# Patient Record
Sex: FEMALE | Race: WHITE
[De-identification: names, ages, dates, MRNs, and addresses within clinical notes are randomized per-mention and may not be internally consistent; named-entity substitution may affect disease eponyms.]

---

## 2019-07-08 ENCOUNTER — HOSPITAL ENCOUNTER (EMERGENCY)
Dept: HOSPITAL 25 - ED | Age: 84
Discharge: HOME | End: 2019-07-08
Payer: MEDICARE

## 2019-07-08 VITALS — SYSTOLIC BLOOD PRESSURE: 158 MMHG | DIASTOLIC BLOOD PRESSURE: 84 MMHG

## 2019-07-08 DIAGNOSIS — Z79.82: ICD-10-CM
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
## 2019-07-08 DIAGNOSIS — S09.90XA: Primary | ICD-10-CM
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
## 2019-07-08 DIAGNOSIS — S62.102A: ICD-10-CM

## 2019-07-08 PROCEDURE — 99283 EMERGENCY DEPT VISIT LOW MDM: CPT

## 2019-07-08 PROCEDURE — 70450 CT HEAD/BRAIN W/O DYE: CPT

## 2019-07-08 PROCEDURE — 90715 TDAP VACCINE 7 YRS/> IM: CPT

## 2019-07-08 PROCEDURE — 90471 IMMUNIZATION ADMIN: CPT

## 2019-07-08 NOTE — CONS
CONSULTATION REPORT:

 

DATE OF CONSULT:  07/08/19 - EMERGENCY DEPT

 

ATTENDING ORTHOPEDIC PROVIDER:  Dr. Jarred Mcdonald.

 

CHIEF COMPLAINT:  Left wrist fracture.

 

HISTORY OF PRESENT ILLNESS:  The patient is an 87-year-old female who suffered 
a mechanical fall at home.  She was walking with a friend when she tripped on 
the sidewalk and fell onto an outstretched left hand.  She is left-hand 
dominant.  She had immediate pain in the left wrist and also hit her right knee
, though she was able to get up and walk home.  She walks without an assistive 
device.  [facility] an independent [facility].  Today, she 
complains of left wrist pain without any other complaints.  Pain is localized 
to the left wrist. It is sharp and severe in nature.  She has had Tylenol for 
pain, which is adequately controlling the pain at rest.

 

PAST MEDICAL HISTORY: Denies.

 

PAST SURGICAL HISTORY:  None.

 

ALLERGIES:  No known drug allergies.

 

REVIEW OF SYSTEMS:  General:  No fever or chills.  HEENT:  No head trauma. 
Cardiac:  No chest pain.  Respiratory:  No shortness of breath.  GI:  No 
abdominal pain.  Musculoskeletal:  Positive for left wrist pain and right knee 
pain.  Neuro: Normal sensation throughout all extremities without any numbness 
or paresthesias. Skin:  Small laceration at the base of the right pinky.

 

PHYSICAL EXAM:  Vital Signs:  Temperature 97.8, pulse rate 62, respiratory rate 
17, oxygen saturation 97% on room air, blood pressure 162/64.  General:  No 
acute distress.  HEENT:  Normocephalic, atraumatic.  Respiratory:  Normal rate 
and effort of breathing.  Cardiovascular:  Radial pulse 2+ bilaterally.  
Capillary refill less than 2 seconds distally BL UE both before and after 
reduction of wrist and bilateral upper extremities.  Abdomen is nondistended.  
Left upper extremity:  Left wrist with obvious deformity and ecchymosis.  The 
patient is able to flex and extend at MCPs, DIPs, PIPs.  Able to produce ok, 
cross-finger and thumbs-up signs.  Sensation is intact to light touch 
throughout all aspects of the hand and digits.  The forearm is compressible.

 

PROCEDURE:  Consent was signed for left wrist hematoma block and reduction of 
the left wrist.  5 cc of 1% lidocaine and 5 cc of bupivacaine were injected 
into the hematoma fracture site.  Left wrist was reduced and splint placed, 
tolerated well by the patient.  Neurovascularly intact distally after reduction.

 

ASSESSMENT:  Left distal radius fracture.

 

PLAN:  Reduction of the left wrist with splint placement.  The patient will 
follow up with Dr. Ewing or Dr. Cardenas, one of our hand surgeons, this week.  
This patient was discussed with Dr. Jarred Mcdonald who agrees with the plan and 
reviewed post reduction films. 



____________________________________ ERIC ALLRED

 

[id number]/CPS #: [id number]

MARCO

## 2019-07-09 ENCOUNTER — HOSPITAL ENCOUNTER (EMERGENCY)
Dept: HOSPITAL 25 - ED | Age: 84
Discharge: NURSING, REHAB OR LONG-TERM CARE | End: 2019-07-09
Payer: MEDICARE

## 2019-07-09 VITALS — DIASTOLIC BLOOD PRESSURE: 74 MMHG | SYSTOLIC BLOOD PRESSURE: 172 MMHG
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
## 2019-07-09 DIAGNOSIS — S52.592A: Primary | ICD-10-CM

## 2019-07-09 LAB
ALBUMIN SERPL BCG-MCNC: 3.9 G/DL (ref 3.2–5.2)
ALBUMIN/GLOB SERPL: 1.5 {RATIO} (ref 1–3)
ALP SERPL-CCNC: 70 U/L (ref 34–104)
ALT SERPL W P-5'-P-CCNC: 11 U/L (ref 7–52)
ANION GAP SERPL CALC-SCNC: 6 MMOL/L (ref 2–11)
AST SERPL-CCNC: 21 U/L (ref 13–39)
BASOPHILS # BLD AUTO: 0 10^3/UL (ref 0–0.2)
BUN SERPL-MCNC: 15 MG/DL (ref 6–24)
BUN/CREAT SERPL: 25.4 (ref 8–20)
CALCIUM SERPL-MCNC: 9 MG/DL (ref 8.6–10.3)
CHLORIDE SERPL-SCNC: 102 MMOL/L (ref 101–111)
EOSINOPHIL # BLD AUTO: 0 10^3/UL (ref 0–0.6)
GLOBULIN SER CALC-MCNC: 2.6 G/DL (ref 2–4)
GLUCOSE SERPL-MCNC: 103 MG/DL (ref 70–100)
HCO3 SERPL-SCNC: 29 MMOL/L (ref 22–32)
HCT VFR BLD AUTO: 38 % (ref 35–47)
HGB BLD-MCNC: 12.6 G/DL (ref 12–16)
LYMPHOCYTES # BLD AUTO: 0.7 10^3/UL (ref 1–4.8)
MCH RBC QN AUTO: 30 PG (ref 27–31)
MCHC RBC AUTO-ENTMCNC: 33 G/DL (ref 31–36)
MCV RBC AUTO: 90 FL (ref 80–97)
MONOCYTES # BLD AUTO: 0.6 10^3/UL (ref 0–0.8)
NEUTROPHILS # BLD AUTO: 7.4 10^3/UL (ref 1.5–7.7)
NRBC # BLD AUTO: 0 10^3/UL
NRBC BLD QL AUTO: 0.1
PLATELET # BLD AUTO: 200 10^3/UL (ref 150–450)
POTASSIUM SERPL-SCNC: 4.1 MMOL/L (ref 3.5–5)
PROT SERPL-MCNC: 6.5 G/DL (ref 6.4–8.9)
RBC # BLD AUTO: 4.19 10^6 /UL (ref 3.7–4.87)
SODIUM SERPL-SCNC: 137 MMOL/L (ref 135–145)
TROPONIN I SERPL-MCNC: 0 NG/ML (ref ?–0.04)
WBC # BLD AUTO: 8.7 10^3/UL (ref 3.5–10.8)

## 2019-07-09 PROCEDURE — 99283 EMERGENCY DEPT VISIT LOW MDM: CPT

## 2019-07-09 PROCEDURE — 36415 COLL VENOUS BLD VENIPUNCTURE: CPT

## 2019-07-09 PROCEDURE — 84484 ASSAY OF TROPONIN QUANT: CPT

## 2019-07-09 PROCEDURE — 71046 X-RAY EXAM CHEST 2 VIEWS: CPT

## 2019-07-09 PROCEDURE — 93005 ELECTROCARDIOGRAM TRACING: CPT

## 2019-07-09 PROCEDURE — 85025 COMPLETE CBC W/AUTO DIFF WBC: CPT

## 2019-07-09 PROCEDURE — 80053 COMPREHEN METABOLIC PANEL: CPT

## 2019-07-09 NOTE — ED
Upper Extremity Pain





- HPI Summary


HPI Summary: 


Patient's an 87-year-old female presenting to the ED for the second time in 2 

days.  Family is at bedside.  Family states overnight she had ripped her 

plaster splints off.  They reapplied the sling and came to the ED.  She is 

complaining of pain.  Tramadol was prescribed to her yesterday which provided 

good effect.  She arrives today with no other complaints.  Family at bedside 

states they would like her to have placement at this time as they're unable to 

care for her at home.








- History of Current Complaint


Chief Complaint: EDExtremityUpper


Stated Complaint: FALL YESTERDAY AND RIPPED CAST OFF PER PT SON


Time Seen by Provider: 07/09/19 06:31


Hx Obtained From: Patient


Onset/Duration: Started Hours Ago


Timing: Constant


Severity Initially: Moderate


Severity Currently: Moderate


Pain Location: Wrist


Character: Aching


Aggravating Factor(s): Movement, Lifting, Flexion, Extension


Alleviating Factor(s): Rest, Ice


Associated Signs & Symptoms: Negative: Swelling, Redness, Bruising





- Risk Factors


Non-Orthopedic Risk Factor: Negative


DVT Risk Factors: Negative


Septic Arthritis Risk Factor: Negative


Compartment Syndrome Risk Factors: Pain





- Allergies/Home Medications


Allergies/Adverse Reactions: 


 Allergies











Allergy/AdvReac Type Severity Reaction Status Date / Time


 


No Known Allergies Allergy   Verified 07/09/19 06:33














PMH/Surg Hx/FS Hx/Imm Hx


Previously Healthy: Yes


Endocrine/Hematology History: 


   Denies: Hx Diabetes


Cardiovascular History: 


   Denies: Hx Hypertension, Hx Pacemaker/ICD


Respiratory History: Reports: Other Respiratory Problems/Disorders


 History: 


   Denies: Hx Renal Disease


Sensory History: 


   Denies: Hx Hearing Aid


Psychiatric History: 


   Denies: Hx Panic Disorder





- Surgical History


Surgery Procedure, Year, and Place: DEVIATED SEPTUM.  TONSILS





- Immunization History


Hx Pertussis Vaccination: No


Immunizations Up to Date: Yes


Infectious Disease History: No


Infectious Disease History: 


   Denies: Traveled Outside the [place] in Last 30 Days





- Social History


Occupation: Unemployed


Lives: Alone


Alcohol Use: None


Hx Substance Use: No


Substance Use Type: Reports: None


Smoking Status (MU): Never Smoked Tobacco





Review of Systems


Constitutional: Negative


Negative: Fever, Chills, Fatigue, Skin Diaphoresis


Negative: Palpitations, Chest Pain


Negative: Abdominal Pain, Vomiting


Positive: no symptoms reported, hematuria


Positive: Arthralgia - left wrist pain/swelling/ecchymosis


Positive: Bruising


Neurological: Negative


All Other Systems Reviewed And Are Negative: Yes





Physical Exam


Triage Information Reviewed: Yes


Vital Signs On Initial Exam: 


 Initial Vitals











Temp Pulse Resp BP Pulse Ox


 


 97.7 F   83   16   183/82   95 


 


 07/09/19 06:30  07/09/19 06:30  07/09/19 06:30  07/09/19 06:30  07/09/19 06:30











Vital Signs Reviewed: Yes


Appearance: Positive: Well-Appearing, Well-Nourished


Skin: Positive: Warm, Skin Color Reflects Adequate Perfusion


Head/Face: Positive: Normal Head/Face Inspection


Eyes: Positive: EOMI, Conjunctiva Clear


Neck: Positive: Supple, No Lymphadenopathy


Respiratory/Lung Sounds: Positive: Clear to Auscultation


Cardiovascular: Positive: RRR, Pulses are Symmetrical in both Upper and Lower 

Extremities


Musculoskeletal: Positive: Pain @ - left wrist/  left wrist pain/swelling/

ecchymosis


Neurological: Positive: Speech Normal


Psychiatric: Positive: Affect/Mood Appropriate





Diagnostics





- Vital Signs


 Vital Signs











  Temp Pulse Resp BP Pulse Ox


 


 07/09/19 08:17   75  19  175/80  94


 


 07/09/19 06:30  97.7 F  83  16  183/82  95














- Laboratory


Lab Results: 


 Lab Results











  07/09/19 07/09/19 Range/Units





  08:53 08:53 


 


WBC  8.7   (3.5-10.8)  10^3/uL


 


RBC  4.19   (3.70-4.87)  10^6 /uL


 


Hgb  12.6   (12.0-16.0)  g/dL


 


Hct  38   (35-47)  %


 


MCV  90   (80-97)  fL


 


MCH  30   (27-31)  pg


 


MCHC  33   (31-36)  g/dL


 


RDW  15   (10-15)  %


 


Plt Count  200   (150-450)  10^3/uL


 


MPV  8.1   (7.4-10.4)  fL


 


Neut % (Auto)  84.3   %


 


Lymph % (Auto)  8.3   %


 


Mono % (Auto)  6.8   %


 


Eos % (Auto)  0.4   %


 


Baso % (Auto)  0.2   %


 


Absolute Neuts (auto)  7.4   (1.5-7.7)  10^3/ul


 


Absolute Lymphs (auto)  0.7 L   (1.0-4.8)  10^3/ul


 


Absolute Monos (auto)  0.6   (0-0.8)  10^3/ul


 


Absolute Eos (auto)  0.0   (0-0.6)  10^3/ul


 


Absolute Basos (auto)  0.0   (0-0.2)  10^3/ul


 


Absolute Nucleated RBC  0.0   10^3/ul


 


Nucleated RBC %  0.1   


 


Sodium   137  (135-145)  mmol/L


 


Potassium   4.1  (3.5-5.0)  mmol/L


 


Chloride   102  (101-111)  mmol/L


 


Carbon Dioxide   29  (22-32)  mmol/L


 


Anion Gap   6  (2-11)  mmol/L


 


BUN   15  (6-24)  mg/dL


 


Creatinine   0.59  (0.51-0.95)  mg/dL


 


Est GFR ( Amer)   116.7  (>60)  


 


Est GFR (Non-Af Amer)   96.4  (>60)  


 


BUN/Creatinine Ratio   25.4 H  (8-20)  


 


Glucose   103 H  ()  mg/dL


 


Calcium   9.0  (8.6-10.3)  mg/dL


 


Total Bilirubin   1.00  (0.2-1.0)  mg/dL


 


AST   21  (13-39)  U/L


 


ALT   11  (7-52)  U/L


 


Alkaline Phosphatase   70  ()  U/L


 


Troponin I   0.00  (<0.04)  ng/mL


 


Total Protein   6.5  (6.4-8.9)  g/dL


 


Albumin   3.9  (3.2-5.2)  g/dL


 


Globulin   2.6  (2-4)  g/dL


 


Albumin/Globulin Ratio   1.5  (1-3)  











Result Diagrams: 


 07/09/19 08:53





 07/09/19 08:53


Lab Statement: Any lab studies that have been ordered have been reviewed, and 

results considered in the medical decision making process.





Course/Dx





- Course


Course Of Treatment: Patient is an 87-year-old female presenting to the ED with 

left wrist injury.  She reports her last night and needs to have this recasted.

  X-ray obtained which shows displacement again of the distal radius.  

Discussed treatment options with family.  Discussed case with Leroy Casey (through Dr. Gillespie) who recommends sugar tong splint.  This was 

placed and appointment with orthopedics was secured tomorrow at 9:15 AM.  Spoke 

with [facility] social work who  was able to place patient at [facility] for 

rehabilitation while she is healing. Discussed with family who agrees with 

plan.  NV intact pre and post splint.





- Diagnoses


Differential Diagnosis/HQI/PQRI: Positive: Fracture (Open), Fracture (Closed)


Provider Diagnoses: 


 Left wrist fracture








Discharge





- Sign-Out/Discharge


Documenting (check all that apply): Patient Departure


Patient Received Moderate/Deep Sedation with Procedure: No





- Discharge Plan


Condition: Stable


Disposition: SKILLED NURSING FACILITY


Patient Education Materials:  Wrist Fracture in Adults (ED)


Referrals: 


Keisha Frost MD [Primary Care Provider] - 


Additional Instructions: 


Tramadol 50mg three times daily as needed for pain (take right before bed)


Follow up with orthopedics at 9am to fill out paperwork (appt at 9:15)


She will need help especially in the morning to help with her confusion


She takes no other medications


She remains ambulatory with some help or with a walker





- Billing Disposition and Condition


Condition: STABLE


Disposition: Skilled Nursing Facility

## 2019-07-09 NOTE — ED
Adult Trauma





- HPI Summary


HPI Summary: 


Patient is an 87-year-old female presenting to the ED with chief complaint of 

left wrist pain, right knee pain and left-sided head injury immediately 

following a fall this morning.   Patient denies any headache, however there is 

a notable ecchymosis to the left sided forehead.  Obvious deformity to the left 

wrist.  Abrasions to the right knee.  Patient states she was walking on the 

sidewalk when she tripped and fell.  She denies any dizziness prior to her fall 

and states this is a mechanical fall.  She was able to walk back to her 

apartment and called the ambulance.  She is only endorsing pain to the left 

wrist at this time.  She does take a baby aspirin daily.








- History of Current Complaint


Chief Complaint: EDFall


Stated Complaint: FALL INJURIES PER PT


Time Seen by Provider: 07/08/19 12:18


Hx Obtained From: Patient


Pregnant?: No


Mechanism of Injury: Twisted


Ambulatory at the Scene: No


Force: Medium


Onset/Duration: Started Hours Ago


Onset of Pain: Hours


Onset Severity: Moderate


Current Severity: Moderate


Pain Intensity: 4


Pain Scale Used: 0-10 Numeric


Character: Aching


Alleviating Factor(s): Nothing


Associated Signs & Symptoms: Positive: Negative





- Allergy/Home Medications


Allergies/Adverse Reactions: 


 Allergies











Allergy/AdvReac Type Severity Reaction Status Date / Time


 


No Known Allergies Allergy   Verified 07/09/19 06:33














PMH/Surg Hx/FS Hx/Imm Hx


Previously Healthy: Yes


Endocrine/Hematology History: 


   Denies: Hx Diabetes


Cardiovascular History: 


   Denies: Hx Hypertension, Hx Pacemaker/ICD


Respiratory History: Reports: Other Respiratory Problems/Disorders


 History: 


   Denies: Hx Renal Disease


Sensory History: 


   Denies: Hx Hearing Aid


Psychiatric History: 


   Denies: Hx Panic Disorder





- Surgical History


Surgery Procedure, Year, and Place: DEVIATED SEPTUM.  TONSILS





- Immunization History


Hx Pertussis Vaccination: No


Immunizations Up to Date: Yes


Infectious Disease History: No


Infectious Disease History: 


   Denies: Traveled Outside the US in Last 30 Days





- Social History


Occupation: Unemployed


Lives: Alone


Alcohol Use: None


Hx Substance Use: No


Substance Use Type: Reports: None


Hx Tobacco Use: No


Smoking Status (MU): Never Smoked Tobacco





Review of Systems


Constitutional: Negative


Negative: Fever, Chills, Fatigue, Skin Diaphoresis


Negative: Palpitations, Chest Pain


Genitourinary: Negative


Positive: no symptoms reported, see HPI


Positive: Arthralgia - left wrist.  Negative: Myalgia


Skin: Negative


Neurological: Negative


All Other Systems Reviewed And Are Negative: Yes





Physical Exam


Triage Information Reviewed: Yes


Vital Signs On Initial Exam: 


 Initial Vitals











Temp Pulse Resp BP Pulse Ox


 


 97.8 F   63   17   162/64   97 


 


 07/08/19 11:55  07/08/19 11:55  07/08/19 11:55  07/08/19 11:55  07/08/19 11:55











Vital Signs Reviewed: Yes


Appearance: Positive: Well-Appearing, Signs of Trauma - cephalohematoma


Skin: Positive: Skin Color Reflects Adequate Perfusion, Other - cephalohematoma 

to the L side of the head


Head/Face: Positive: Cephalohematoma


Eyes: Positive: EOMI, ANNI, Conjunctiva Clear


Neck: Positive: Supple, No Lymphadenopathy


Respiratory/Lung Sounds: Positive: Clear to Auscultation, Breath Sounds Present


Cardiovascular: Positive: RRR, Pulses are Symmetrical in both Upper and Lower 

Extremities


Musculoskeletal: Positive: Strength/ROM Intact


Neurological: Positive: Speech Normal


Psychiatric: Positive: Normal, Affect/Mood Appropriate


AVPU Assessment: Alert





Diagnostics





- Vital Signs


 Vital Signs











  Temp Pulse Resp BP Pulse Ox


 


 07/08/19 15:54  98 F  58  17  158/84  95


 


 07/08/19 11:55  97.8 F  63  17  162/64  97














- Laboratory


Lab Statement: Any lab studies that have been ordered have been reviewed, and 

results considered in the medical decision making process.





Adult Trauma Course/Dx





- Course


Course Of Treatment: During this course of treatment, the patient's evaluated 

for left wrist pain and deformity as well as head injury after a fall.  She 

does endorse some abrasions to the right knee, but is able to flex and extend 

at the knee.  She was able to ambulate back to her apartment to call the 

ambulance.  Limited range of motion to the left wrist.  She denies any 

headache.  Denies LOC.  Patient does take baby aspirin daily.  Brain CT 

obtained which was read as negative.  Left wrist x-ray obtained which shows a 

comminuted impacted fracture of the left distal radius. Called Dr. Rivera.  

Khushbu Archer to see the patient in the ED.  Hematoma block and reduction 

completed by Khushbu.  Repeat scans shows fx successfully reduced.  Splint 

placed using sugar tong.  NV intact pre and post reduction.  Given 5mg 

hydrocodone. Will f/u with Dr. Cardenas or Dr. Ewing this week.  Sling given.  

Patient ambulating well.





- Diagnoses


Differential Diagnosis/HQI/PQRI: Positive: Hematoma(s)


Provider Diagnoses: 


 Head injury, Wrist fracture








Discharge





- Sign-Out/Discharge


Documenting (check all that apply): Patient Departure


Patient Received Moderate/Deep Sedation with Procedure: No





- Discharge Plan


Condition: Stable


Disposition: HOME


Prescriptions: 


traMADol TAB* [Ultram*] 50 mg PO Q12H PRN #8 tab MDD 2


 PRN Reason: Pain


Patient Education Materials:  Wrist Fracture in Adults (ED)


Referrals: 


Keisha Frost MD [Primary Care Provider] - 


Eron Cardenas MD [Medical Doctor] - 


Sujata Ewing MD [Medical Doctor] - 


Additional Instructions: 


Please follow up with Dr. Cardenas or Dr. Ewing


Call tomorrow for an appt


Tramadol up to twice daily as needed for pain


Ibruprofen 600mg three times daily x 3-4 days


Keep the arm in the sling


Do not get the sling wet





- Billing Disposition and Condition


Condition: STABLE


Disposition: Home

## 2019-07-11 ENCOUNTER — HOSPITAL ENCOUNTER (INPATIENT)
Dept: HOSPITAL 25 - MED | Age: 84
LOS: 4 days | Discharge: HOME HEALTH | DRG: 690 | End: 2019-07-15
Attending: INTERNAL MEDICINE | Admitting: INTERNAL MEDICINE
Payer: MEDICARE

## 2019-07-11 DIAGNOSIS — Z91.040: ICD-10-CM

## 2019-07-11 DIAGNOSIS — Z91.81: ICD-10-CM

## 2019-07-11 DIAGNOSIS — Z79.1: ICD-10-CM

## 2019-07-11 DIAGNOSIS — Z79.891: ICD-10-CM

## 2019-07-11 DIAGNOSIS — Z79.82: ICD-10-CM

## 2019-07-11 DIAGNOSIS — E53.8: ICD-10-CM

## 2019-07-11 DIAGNOSIS — Z79.899: ICD-10-CM

## 2019-07-11 DIAGNOSIS — M47.816: ICD-10-CM

## 2019-07-11 DIAGNOSIS — N39.0: Primary | ICD-10-CM

## 2019-07-11 DIAGNOSIS — B96.20: ICD-10-CM

## 2019-07-11 DIAGNOSIS — J30.9: ICD-10-CM

## 2019-07-11 DIAGNOSIS — R06.00: ICD-10-CM

## 2019-07-11 DIAGNOSIS — M54.2: ICD-10-CM

## 2019-07-11 DIAGNOSIS — S52.92XD: ICD-10-CM

## 2019-07-11 DIAGNOSIS — S09.90XA: ICD-10-CM

## 2019-07-11 DIAGNOSIS — M79.89: ICD-10-CM

## 2019-07-11 DIAGNOSIS — K57.90: ICD-10-CM

## 2019-07-11 DIAGNOSIS — M54.5: ICD-10-CM

## 2019-07-11 DIAGNOSIS — K59.00: ICD-10-CM

## 2019-07-11 DIAGNOSIS — Z88.0: ICD-10-CM

## 2019-07-11 DIAGNOSIS — R41.0: ICD-10-CM
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
## 2019-07-11 DIAGNOSIS — R05: ICD-10-CM

## 2019-07-11 DIAGNOSIS — Z88.8: ICD-10-CM
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
## 2019-07-11 DIAGNOSIS — E78.5: ICD-10-CM
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
## 2019-07-11 DIAGNOSIS — F03.90: ICD-10-CM

## 2019-07-11 LAB
ALBUMIN SERPL BCG-MCNC: 4.1 G/DL (ref 3.2–5.2)
ALBUMIN/GLOB SERPL: 1.4 {RATIO} (ref 1–3)
ALP SERPL-CCNC: 69 U/L (ref 34–104)
ALT SERPL W P-5'-P-CCNC: 17 U/L (ref 7–52)
ANION GAP SERPL CALC-SCNC: 7 MMOL/L (ref 2–11)
AST SERPL-CCNC: 28 U/L (ref 13–39)
BASOPHILS # BLD AUTO: 0 10^3/UL (ref 0–0.2)
BUN SERPL-MCNC: 14 MG/DL (ref 6–24)
BUN/CREAT SERPL: 22.6 (ref 8–20)
CALCIUM SERPL-MCNC: 9.8 MG/DL (ref 8.6–10.3)
CHLORIDE SERPL-SCNC: 103 MMOL/L (ref 101–111)
EOSINOPHIL # BLD AUTO: 0.2 10^3/UL (ref 0–0.6)
GLOBULIN SER CALC-MCNC: 2.9 G/DL (ref 2–4)
GLUCOSE SERPL-MCNC: 126 MG/DL (ref 70–100)
HCO3 SERPL-SCNC: 29 MMOL/L (ref 22–32)
HCT VFR BLD AUTO: 36 % (ref 35–47)
HGB BLD-MCNC: 12.3 G/DL (ref 12–16)
LYMPHOCYTES # BLD AUTO: 1.6 10^3/UL (ref 1–4.8)
MCH RBC QN AUTO: 30 PG (ref 27–31)
MCHC RBC AUTO-ENTMCNC: 34 G/DL (ref 31–36)
MCV RBC AUTO: 89 FL (ref 80–97)
MONOCYTES # BLD AUTO: 0.7 10^3/UL (ref 0–0.8)
NEUTROPHILS # BLD AUTO: 4.7 10^3/UL (ref 1.5–7.7)
NRBC # BLD AUTO: 0 10^3/UL
NRBC BLD QL AUTO: 0.1
PLATELET # BLD AUTO: 228 10^3/UL (ref 150–450)
POTASSIUM SERPL-SCNC: 4.3 MMOL/L (ref 3.5–5)
PROT SERPL-MCNC: 7 G/DL (ref 6.4–8.9)
RBC # BLD AUTO: 4.07 10^6 /UL (ref 3.7–4.87)
RBC UR QL AUTO: (no result)
SODIUM SERPL-SCNC: 139 MMOL/L (ref 135–145)
WBC # BLD AUTO: 7.2 10^3/UL (ref 3.5–10.8)
WBC UR QL AUTO: (no result)

## 2019-07-11 PROCEDURE — 83605 ASSAY OF LACTIC ACID: CPT

## 2019-07-11 PROCEDURE — 70450 CT HEAD/BRAIN W/O DYE: CPT

## 2019-07-11 PROCEDURE — 87086 URINE CULTURE/COLONY COUNT: CPT

## 2019-07-11 PROCEDURE — 86140 C-REACTIVE PROTEIN: CPT

## 2019-07-11 PROCEDURE — 80053 COMPREHEN METABOLIC PANEL: CPT

## 2019-07-11 PROCEDURE — 81003 URINALYSIS AUTO W/O SCOPE: CPT

## 2019-07-11 PROCEDURE — 36415 COLL VENOUS BLD VENIPUNCTURE: CPT

## 2019-07-11 PROCEDURE — 81015 MICROSCOPIC EXAM OF URINE: CPT

## 2019-07-11 PROCEDURE — G0378 HOSPITAL OBSERVATION PER HR: HCPCS

## 2019-07-11 PROCEDURE — 87186 SC STD MICRODIL/AGAR DIL: CPT

## 2019-07-11 PROCEDURE — 87077 CULTURE AEROBIC IDENTIFY: CPT

## 2019-07-11 PROCEDURE — 85025 COMPLETE CBC W/AUTO DIFF WBC: CPT

## 2019-07-11 PROCEDURE — 72125 CT NECK SPINE W/O DYE: CPT

## 2019-07-11 PROCEDURE — 97530 THERAPEUTIC ACTIVITIES: CPT

## 2019-07-11 RX ADMIN — TRAMADOL HYDROCHLORIDE PRN MG: 50 TABLET, FILM COATED ORAL at 22:05

## 2019-07-11 RX ADMIN — CEFTRIAXONE SODIUM SCH MLS/HR: 1 INJECTION, POWDER, FOR SOLUTION INTRAVENOUS at 17:38

## 2019-07-11 NOTE — XMS REPORT
Continuity of Care Document (CCD)

 Created on:July 10, 2019



Patient:Kena Roth

Sex:Female

:1931

External Reference #:[id number]





Demographics







 Address  [address] [place] [address]

 

 Home Phone  [phone]

 

 Email Address  [email or web address]

 

 Preferred Language  en

 

 Marital Status  Not  or 

 

 Qu[employer] Affiliation  Unknown

 

 Race  White

 

 Ethnic Group  Not  or 









Author







 Name  Mirian Ordoñez









Care Team Providers







 Name  Role  Phone

 

 Keisha Frost MD  Primary Care Physician  Unavailable









Payers







 Date  Identification Numbers  Payment Provider  Subscriber

 

   Policy Number: [id number]  Medicare  Kena Roth









 PayID: [id number]  [address]









 [place] [address]









   Policy Number: [id number]  BS [facility]  Kena Roth









 Group Name: [id number]  [address] [address]

 

 PayID: [id number]  [place]rigal [id number]









 Expires: 2019  Policy Number: [id number]  Medicare  Kena Roth









 PayID: [id number]  [address]









 [place] [address]







Problems







 Active Problems  Provider  Date

 

 Closed fracture of distal end of radius  Eron Cardenas MD  Onset: 07/10/2019







Family History







 Date  Family Member(s)  Observation  Comments

 

   General  Cancer  

 

   General  Alzheimer's Disease  







Social History







 Type  Date  Description  Comments

 

 Birth Sex    Unknown  

 

 Lives With    Assisted living  

 

 ETOH Use    Never used alcohol  

 

 Tobacco Use  Start: Unknown  Patient has never smoked  

 

 Smoking Status  Reviewed: 07/10/19  Patient has never smoked  

 

 Exercise Type/Frequency    Exercises regularly  







Allergies, Adverse Reactions, Alerts







 Active Allergies  Reaction  Severity  Comments  Date

 

 Latex        07/10/2019

 

 Adhesive        07/10/2019







Medications







 Active Medications  SIG  Qnty  Indications  Ordering Provider  Date

 

 Oxycodone-Acetaminophe  1 tabs by mouth      Unknown  



 n  every 4-6 hours        



 5-325mg Tablets  as needed for        



   pain        







Vital Signs







 Date  Vital  Result  Comment

 

 07/10/2019 10:03am  Height  62 inches  5'2"









 Weight  119.00 lb  

 

 Heart Rate  70 /min  

 

 BP Systolic  138 mmHg  

 

 BP Diastolic  70 mmHg  

 

 Respiratory Rate  16 /min  

 

 Pain Level  3  

 

 BMI (Body Mass Index)  21.8 kg/m2  







Procedures







 Date  Code  Description  Status

 

 07/10/2019  49204  Short Arm Cast Application  Completed

 

 03/15/2018  92695  ECHO Transthorasic Realtime 2D W Doppler & Color Flow Hosp  
Completed







Plan of Treatment

Future Appointment(s):2019  9:15 am - Eron Cardenas MD at [facility]07/10/2019 - Eron Cardenas, [id number] Other intraarticular 
fracture of lower end of left radius, iFollow up:Follow up:   3 weeks

## 2019-07-12 RX ADMIN — TRAMADOL HYDROCHLORIDE PRN MG: 50 TABLET, FILM COATED ORAL at 16:05

## 2019-07-12 RX ADMIN — TRAMADOL HYDROCHLORIDE PRN MG: 50 TABLET, FILM COATED ORAL at 21:35

## 2019-07-12 RX ADMIN — ACETAMINOPHEN PRN MG: 325 TABLET ORAL at 09:12

## 2019-07-12 RX ADMIN — CEFTRIAXONE SODIUM SCH MLS/HR: 1 INJECTION, POWDER, FOR SOLUTION INTRAVENOUS at 18:15

## 2019-07-12 RX ADMIN — ACETAMINOPHEN PRN MG: 325 TABLET ORAL at 16:04

## 2019-07-12 NOTE — PN
Progress Note





- Progress Note


Date of Service: 07/12/19


Note: 


Pt sustained L DRF Monday, being treated non-op by Dr Cardenas. Placed in [facility] 

earlier this week. Asked by medical team to assess cast.





Pt with swelling and ecchymoses in hand and at elbow. Able to slide my finger 

into both ends of cast and not too tight. Fingers wwp with good cap refill. Has 

been walking halls with wrist hanging down prior to my eval.





Cast seems to be fitting well. Recommend more strict elevation when at rest, d/

w pt and family. Can f/u as scheduled with Dr Cardenas.





Jarred Mcdonald MD

## 2019-07-12 NOTE — HP
HISTORY AND PHYSICAL:

 

DATE OF ADMISSION:  07/11/19

 

HISTORY:  Ms. Roth is an 87-year-old woman being admitted for possible urinary 
tract infection.  The patient has had recent weakness.  She had a fall that 
resulted in a fracture of her left wrist.  Her family believes that she fell 
because of weakness, that she is usually quite strong and not prone to falling.
  She presented with this injury on 07/08/19.  She had dislocation.  Fracture 
was set and she was placed in a clamshell-type cast.  Her son stayed with her 
that night.  She removed the cast herself and the next day presented back to 
the emergency room after having fallen and another cast was put on.  The family 
felt that they could not care for her, so went into [facility].  
She saw Dr. Cardenas the following day.  He said that he did not feel she was a 
surgical candidate and that her fracture would heal without resetting it.  He 
felt that her arm would be functional, although there would be a deformity.  
The family asked [facility] to do the urinalysis as they felt that she was 
having urinary tract infection. He son helped to collect the urine which he 
said looked very cloudy. She did not get an antibiotic. They have been very 
frustrated with the care that she has received there and so they brought her to 
see me today, concerned that she was going to fall again unless her urinary 
tract infection was treated.  She was exhibiting more confusion. She does have 
underlying dementia.  The family is in the process of trying to get her into 
[place].  She is being admitted at this time.  Son also reports that she fell 
at [facility] last night and believed that she hit her head again.  She had an 
initial head injury with the fall on 07/08/19.

 

PREVIOUS MEDICAL PROBLEMS:  Include:

1.  History of possible TIA.

2.  B12 deficiency.

3.  Allergic rhinitis.

4.  Chronic cough.

5.  Hyperlipidemia.

6.  Constipation.

7.  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

8.  Remote history of BPPV.

9.  Diverticulosis.

 

CURRENT MEDICATIONS:

1.  Aspirin 81 mg daily.

2.  B12 1000 mcg daily.

3.  Flaxseed.

4.  Acetaminophen p.r.n.

5.  Ibuprofen p.r.n.

6.  Oxycodone 5 mg q.4 h p.r.n. pain.

7.  She also had taken tramadol that was apparently ineffective.

 

ALLERGIES:  To CYCLOBENZAPRINE, FLOVENT DISKUS, LATEX, MELATONIN, PENICILLINS, 
TRAZODONE.

 

HABITS:  Tobacco:  None.  Alcohol:  None.

 

FAMILY HISTORY:  Noncontributory.

 

SOCIAL AND PERSONAL HISTORY:  The patient had been living in [facility].  She 
is .  She is retired.  Her son and granddaughter are with her today.  
They live locally.  The patient has been concerned about living [facility]
, said she has always preferred to live with family.

 

REVIEW OF SYSTEMS:  Generally, she has been weaker, more confused.  She denied 
fever, chills, or sweats.  She has had a history of insomnia.  Skin:  She has a 
skin tear underneath the ulnar aspect of her cast.  She has ecchymosis around 
her left eye.  HEENT:  See above.  Nodes:  Negative.  Heme:  Negative.  Breasts
: Negative.  Endocrine:  Negative.  Respiratory:  Recent pneumonia few months 
ago. Cardiovascular:  History of ectopic beats.  GI:  Negative.  :  See 
above.  She has had frequency of urination.  GYN:  Negative:  Musculoskeletal:  
See above. Neuro:  See above.  Psychiatric:  She has been depressed about her 
living situation.

 

                               PHYSICAL EXAMINATION

 

GENERAL:  She is an elderly white female with cast on her left arm, in no acute 
distress.

 

VITAL SIGNS:  Blood pressure 136/64, pulse 100 and irregular, respirations 16, 
temperature 98.5.

 

SKIN:  There are ecchymosis, abrasions around her left eye.  There is a skin 
tear underneath the ulnar aspect of her cast.  Her left hand is quite 
ecchymotic.

 

HEENT:  See above.  There is swelling about the left eye and forehead.  Full 
EOMs. Mouth:  Pharynx unremarkable.

 

NECK:  Supple.

 

CHEST:  Clear. Left anterior chest wall tenderness and mild swelling. 

 

HEART:  Shows regular rhythm with frequent ectopics.  No murmurs or gallops.

 

ABDOMEN:  Soft and nontender.  No masses or organomegaly.  Bowel sounds are 
active.

 

EXTREMITIES:  See above.  Left hand is quite swollen.  Lower extremities are 
without edema.

 

NEUROLOGIC:  Without gross focal or lateralizing signs.

 

 IMPRESSION AND PLAN:  The patient with probable urinary tract infection, being 
admitted for a 23-hour OBV.  She is being started on ceftriaxone.  Labs 
including urinalysis has been ordered.  Urine culture has been ordered.  With 
her recent head injury, I want to re-image her brain with a CT scan.  If she 
does not have any intracranial bleeding, we will consider DVT prophylaxis 
tomorrow.  I will discuss code status with the family.  Her pain will be 
controlled with Tylenol for now.  If her hand continues to swell, we will ask 
for orthopedist to look at her cast.

 

 

 

[id number]/[facility] #: [id number]

MARCO

## 2019-07-13 RX ADMIN — TRAMADOL HYDROCHLORIDE PRN MG: 50 TABLET, FILM COATED ORAL at 18:31

## 2019-07-13 RX ADMIN — CEFTRIAXONE SODIUM SCH MLS/HR: 1 INJECTION, POWDER, FOR SOLUTION INTRAVENOUS at 18:32

## 2019-07-13 RX ADMIN — TRAMADOL HYDROCHLORIDE PRN MG: 50 TABLET, FILM COATED ORAL at 11:53

## 2019-07-13 RX ADMIN — ACETAMINOPHEN PRN MG: 325 TABLET ORAL at 20:15

## 2019-07-14 RX ADMIN — ACETAMINOPHEN PRN MG: 325 TABLET ORAL at 19:44

## 2019-07-14 RX ADMIN — CEFTRIAXONE SODIUM SCH MLS/HR: 1 INJECTION, POWDER, FOR SOLUTION INTRAVENOUS at 17:58

## 2019-07-14 RX ADMIN — TRAMADOL HYDROCHLORIDE PRN MG: 50 TABLET, FILM COATED ORAL at 03:26

## 2019-07-14 RX ADMIN — TRAMADOL HYDROCHLORIDE PRN MG: 50 TABLET, FILM COATED ORAL at 17:58

## 2019-07-14 RX ADMIN — ACETAMINOPHEN PRN MG: 325 TABLET ORAL at 09:50

## 2019-07-15 VITALS — DIASTOLIC BLOOD PRESSURE: 70 MMHG | SYSTOLIC BLOOD PRESSURE: 169 MMHG

## 2019-07-15 RX ADMIN — TRAMADOL HYDROCHLORIDE PRN MG: 50 TABLET, FILM COATED ORAL at 01:14

## 2019-07-15 RX ADMIN — TRAMADOL HYDROCHLORIDE PRN MG: 50 TABLET, FILM COATED ORAL at 17:29

## 2019-07-15 RX ADMIN — ACETAMINOPHEN PRN MG: 325 TABLET ORAL at 15:38

## 2019-09-06 ENCOUNTER — HOSPITAL ENCOUNTER (EMERGENCY)
Dept: HOSPITAL 25 - ED | Age: 84
Discharge: NURSING, REHAB OR LONG-TERM CARE | End: 2019-09-06
Payer: MEDICARE

## 2019-09-06 VITALS — DIASTOLIC BLOOD PRESSURE: 95 MMHG | SYSTOLIC BLOOD PRESSURE: 171 MMHG

## 2019-09-06 DIAGNOSIS — Z79.899: ICD-10-CM

## 2019-09-06 DIAGNOSIS — Z88.8: ICD-10-CM

## 2019-09-06 DIAGNOSIS — M47.896: ICD-10-CM

## 2019-09-06 DIAGNOSIS — Z91.040: ICD-10-CM

## 2019-09-06 DIAGNOSIS — M54.5: Primary | ICD-10-CM
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
## 2019-09-06 DIAGNOSIS — F03.90: ICD-10-CM

## 2019-09-06 DIAGNOSIS — Z88.0: ICD-10-CM
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
## 2019-09-06 DIAGNOSIS — Z79.82: ICD-10-CM

## 2019-09-06 LAB
ALBUMIN SERPL BCG-MCNC: 4.1 G/DL (ref 3.2–5.2)
ALBUMIN/GLOB SERPL: 1.5 {RATIO} (ref 1–3)
ALP SERPL-CCNC: 89 U/L (ref 34–104)
ALT SERPL W P-5'-P-CCNC: 12 U/L (ref 7–52)
ANION GAP SERPL CALC-SCNC: 8 MMOL/L (ref 2–11)
AST SERPL-CCNC: 21 U/L (ref 13–39)
BASOPHILS # BLD AUTO: 0 10^3/UL (ref 0–0.2)
BUN SERPL-MCNC: 14 MG/DL (ref 6–24)
BUN/CREAT SERPL: 25 (ref 8–20)
CALCIUM SERPL-MCNC: 9.4 MG/DL (ref 8.6–10.3)
CHLORIDE SERPL-SCNC: 103 MMOL/L (ref 101–111)
EOSINOPHIL # BLD AUTO: 0 10^3/UL (ref 0–0.6)
GLOBULIN SER CALC-MCNC: 2.7 G/DL (ref 2–4)
GLUCOSE SERPL-MCNC: 122 MG/DL (ref 70–100)
HCO3 SERPL-SCNC: 26 MMOL/L (ref 22–32)
HCT VFR BLD AUTO: 39 % (ref 35–47)
HGB BLD-MCNC: 13.2 G/DL (ref 12–16)
LYMPHOCYTES # BLD AUTO: 0.9 10^3/UL (ref 1–4.8)
MCH RBC QN AUTO: 30 PG (ref 27–31)
MCHC RBC AUTO-ENTMCNC: 34 G/DL (ref 31–36)
MCV RBC AUTO: 89 FL (ref 80–97)
MONOCYTES # BLD AUTO: 0.8 10^3/UL (ref 0–0.8)
NEUTROPHILS # BLD AUTO: 6.7 10^3/UL (ref 1.5–7.7)
NRBC # BLD AUTO: 0 10^3/UL
NRBC BLD QL AUTO: 0.1
PLATELET # BLD AUTO: 236 10^3/UL (ref 150–450)
POTASSIUM SERPL-SCNC: 3.9 MMOL/L (ref 3.5–5)
PROT SERPL-MCNC: 6.8 G/DL (ref 6.4–8.9)
RBC # BLD AUTO: 4.38 10^6 /UL (ref 3.7–4.87)
RBC UR QL AUTO: (no result)
SODIUM SERPL-SCNC: 137 MMOL/L (ref 135–145)
WBC # BLD AUTO: 8.4 10^3/UL (ref 3.5–10.8)

## 2019-09-06 PROCEDURE — 99284 EMERGENCY DEPT VISIT MOD MDM: CPT

## 2019-09-06 PROCEDURE — 72100 X-RAY EXAM L-S SPINE 2/3 VWS: CPT

## 2019-09-06 PROCEDURE — 36415 COLL VENOUS BLD VENIPUNCTURE: CPT

## 2019-09-06 PROCEDURE — 83605 ASSAY OF LACTIC ACID: CPT

## 2019-09-06 PROCEDURE — 80053 COMPREHEN METABOLIC PANEL: CPT

## 2019-09-06 PROCEDURE — 81015 MICROSCOPIC EXAM OF URINE: CPT

## 2019-09-06 PROCEDURE — 86140 C-REACTIVE PROTEIN: CPT

## 2019-09-06 PROCEDURE — 81003 URINALYSIS AUTO W/O SCOPE: CPT

## 2019-09-06 PROCEDURE — 87040 BLOOD CULTURE FOR BACTERIA: CPT

## 2019-09-06 PROCEDURE — 83690 ASSAY OF LIPASE: CPT

## 2019-09-06 PROCEDURE — 85025 COMPLETE CBC W/AUTO DIFF WBC: CPT

## 2019-09-06 NOTE — PN
Progress Note





- Progress Note


Date of Service: 19


Note: 


No change in treatment needed. 








Patient Name:         GLORIA LOZOYA                                               

                        Medical Record#: [id number]


Ordering Physician: Ruchi Nolasco MD                                           

                        Acct.#: [id number]


:     1931         Age: 88   Sex: F                                   

                        Location: EMERGENCY DEPARTMENT


Exam Date: 19 0140                                                       

                        ADM Status: DEP ER


Order Information:                         SP LUMBAR AP/LAT 2-3 VIEWS


Accession Number:                          [id number]


CPT:                                       31029


INDICATION:  Low back pain.





COMPARISON:  There are no relevant prior studies available for comparison.





TECHNIQUE: 3 views of the lumbar spine were obtained including lateral, AP and a


coned-down lateral view of the lumbar sacral junction.





FINDINGS:





There is mild straightening of lumbar lordotic curvature with grade 1 listhesis 

of L4 on


L5.





The bones are osteopenic. The vertebral body heights are grossly maintained. No 

displaced


fracture is identified.





There is a basilar vertebral disc height loss at L5-S1. Facet arthropathy is 

moderate from


L3-L4 through L5-S1.





Partially imaged gaseous loops of bowel measure up to 6.4 cm. A calcified 

structure in the


pelvis is likely representative of a uterine leiomyoma.





IMPRESSION:


1.  No displaced fracture by radiograph.


2.  Moderate multilevel spondylosis.


3.  Dilated gaseous loops of bowel, measuring up to 6.4 cm, are partially 

imaged.





R1F








Preliminary Imaging Read 


R1F                                                                        





____________________________________________________________


<Electronically signed by Gera Bass MD in OV>  19


Dictated By: Gera Bass MD


Dictated Date/Time: 19


Transcribed Date/Time: 19


Copy to:














CC:Keisha Frost MD; Ruchi Nolasco MD





This report is only to be considered final once signed by the Provider(s) as 

displayed in the "<Electronically Signed by >" field (s). Absence of a 


signature indicates the report is in a draft status and still needs to be 

finalized. In the event this document was created by someone other than the 


signing Provider, the individual initiating the document will be listed in the 

"Entered by:" or "Dictated by:" fields.


                                                                 1 of 2

## 2019-09-06 NOTE — ED
Back Pain





- HPI Summary


HPI Summary: 





This pt is an 87 Y/O F brought in by EMS from [place] to [facility] with a CC of a 

fall that occurred on 9/5/19 at night while she was going to the bathroom. She 

states that she was dizzy prior to the fall. She denies hitting her head or 

losing consciousness. She states that she did not have an onset of pain until 

today. She states that her back is in pain and she has a rating of 5/10 for 

severity. She states that she has had no issues with peeing and has no 

abdominal pain. She also denies any recent fevers, CP, SOB, N/V, and headaches. 

THIS PT HAS A PMHX OF DEMENTIA AND IS A LEVEL 5 CAVEAT. She also has a Hx of 

respiratory diseases. She states no aggravating or alleviating factors. 





- History of Current Complaint


Chief Complaint: EDGeneral


Stated Complaint: BACK PAIN PER EMS


Time Seen by Provider: 09/06/19 01:20


Hx From Patient Unobtainable Due To: Dementia - Pt has a Hx of dementia and 

therefore her HPI must be viewed with some scrunity


Onset/Duration: Sudden Onset, Lasting Days - 1


Onset/Duration: Started Days Ago, Still Present


Timing: Constant


Back Pain Location: Is Discrete @ - low back


Severity Initially: Moderate


Severity Currently: Moderate


Pain Intensity: 5


Pain Scale Used: 0-10 Numeric


Aggravating Symptom(s): Movement


Alleviating Symptom(s): Nothing


Associated Signs And Symptoms: Positive: Negative - fevers, CP, SOB, N/V, and 

headaches





- Allergies/Home Medications


Allergies/Adverse Reactions: 


 Allergies











Allergy/AdvReac Type Severity Reaction Status Date / Time


 


cyclobenzaprine Allergy  See Comment Verified 09/06/19 01:05


 


fluticasone Allergy  See Comment Verified 09/06/19 01:05





[From Flovent Diskus]     


 


latex Allergy  See Comment Verified 09/06/19 01:05


 


melatonin Allergy  See Comment Verified 09/06/19 01:05


 


Penicillins Allergy  See Comment Verified 09/06/19 01:05


 


trazodone Allergy  See Comment Verified 09/06/19 01:05














PMH/Surg Hx/FS Hx/Imm Hx


Previously Healthy: Yes


Endocrine/Hematology History: 


   Denies: Hx Diabetes


Cardiovascular History: 


   Denies: Hx Hypertension, Hx Pacemaker/ICD


Respiratory History: Reports: Other Respiratory Problems/Disorders


 History: 


   Denies: Hx Renal Disease


Sensory History: Reports: Hx Contacts or Glasses - no glasses with pt


   Denies: Hx Hearing Aid


Opthamlomology History: Reports: Hx Contacts or Glasses - no glasses with pt


Neurological History: Reports: Hx Dementia


Psychiatric History: 


   Denies: Hx Panic Disorder





- Surgical History


Surgery Procedure, Year, and Place: DEVIATED SEPTUM.  TONSILS


Infectious Disease History: No


Infectious Disease History: 


   Denies: Traveled Outside the [place] in Last 30 Days





- Social History


Alcohol Use: None


Hx Substance Use: No


Substance Use Type: Reports: None


Smoking Status (MU): Never Smoked Tobacco





Review of Systems





- ROS Summary


Review of Systems Summary: 





A FULL ROS IS UNOBTAINABLE DUE TO HER HX OF DEMENTIA


Positive: Other - POSITIVE: low back pain 


All Other Systems Reviewed And Are Negative: No





Physical Exam





- Summary


Physical Exam Summary: 





General: Well-developed, Well-nourished FEMALE. No acute distress. NO ACUTE 

PROCESSES


HEENT: Normocephalic, Atraumatic. 


              Eyes: Conjuctiva normal, PERRL.


              Ears: TMs within normal limits.


              Nares: (-) discharge, (-) erythema.


              Oropharynx: Clear, mucous membranes moist, (-) exudates. 


Neck: Soft, FROM, (-) lymphadenopathy, (-) thyromegaly, (-) JVD.


Cardiovascular: Normal sinus rhythm, (-) murmur.


Lungs: Clear to auscultation bilaterally (-) wheezes, (-) rales, (-) rhonchi.


Abdomen: Soft, non-tender, non-distended, (-) organomegaly, normal bowel sounds.


Back: (-) CVA tenderness, Left upper thoracic spine yellowish bruise that is 

days old


Extremities: No edema.


Skin: Warm, dry, (-) rash.


Neuro: Alert and oriented x3, no focal deficits.


Psychiatric: Mood normal, affect normal, pleasantly demented and repetitive.








A FULL PE IS UNOBTAINABLE DUE TO THE PT'S HX OF DEMENTIA. 


Triage Information Reviewed: Yes


Vital Signs On Initial Exam: 


 Initial Vitals











Temp Pulse Resp BP Pulse Ox


 


 97.8 F   88   18   176/90   93 


 


 09/06/19 00:16  09/06/19 00:16  09/06/19 00:16  09/06/19 00:16  09/06/19 00:16











Vital Signs Reviewed: Yes





Diagnostics





- Vital Signs


 Vital Signs











  Temp Pulse Resp BP Pulse Ox


 


 09/06/19 00:16  97.8 F  88  18  176/90  93














- Laboratory


Result Diagrams: 


 09/06/19 02:14





 09/06/19 02:14


Lab Statement: Any lab studies that have been ordered have been reviewed, and 

results considered in the medical decision making process.





Back Pain Course/Dx





- Course


Course Of Treatment: This pt is an 87 Y/O F presenting to [facility] with a CC of a 

fall that occurred on 9/5/19 at night while she was going to the bathroom. She 

states that she was dizzy prior to the fall. THIS PT IS A LEVEL 5 CAVEAT DUE TO 

HER HX OF DEMENTIA. A full PE was unobtainable. She did have a yellowish bruis 

over her left upper thoracic spine, but nothing acute. She was pleasantly 

demented and repetitive.  Het thoracic X-Ray showed no acute fracture. She will 

be discharged home with a Dx of back pain and a fall.





- Diagnoses


Provider Diagnoses: 


 Fall, Low back pain








Discharge ED





- Sign-Out/Discharge


Documenting (check all that apply): Patient Departure - discharge back to 

[place]


Patient Received Moderate/Deep Sedation with Procedure: No





- Discharge Plan


Condition: Stable


Disposition: SKILLED NURSING FACILITY


Patient Education Materials:  Fall Prevention for Older Adults (ED), Acute Low 

Back Pain (ED)


Referrals: 


Keisha Frost MD [Primary Care Provider] - 2 Days


Additional Instructions: 


PLEASE FOLLOW UP WITH YOUR PRIMARY CARE PROVIDER IN 2-3 DAYS AND RETURN TO THE 

EMERGENCY DEPARTMENT FOR ANY NEW OR WORSENING SYMPTOMS.





- Billing Disposition and Condition


Condition: STABLE


Disposition: Skilled Nursing Facility





- Attestation Statements


Document Initiated by Scribe: Yes


Documenting Scribe: Simón Carrillo


Provider For Whom Jaredibe is Documenting (Include Credential): Ruchi Nolasco MD


Scribe Attestation: 


Simón BENEDICT scribed for Ruchi Nolasco MD on 09/06/19 at 0509. 


Scribe Documentation Reviewed: Yes


Provider Attestation: 


The documentation as recorded by the Simón bear accurately reflects 

the service I personally performed and the decisions made by me, Ruchi Nolasco MD


Status of Scribe Document: Viewed

## 2019-09-06 NOTE — XMS REPORT
Continuity of Care Document (CCD)

 Created on:2019



Patient:Kena Roth

Sex:Female

:1931

External Reference #:[id number]





Demographics







 Address  [address]



   [address]



   [place] [address]

 

 Mobile Phone  [phone]

 

 Email Address  [email or web address]

 

 Preferred Language  en

 

 Marital Status  Not  or 

 

 Taoism Affiliation  Unknown

 

 Race  White

 

 Ethnic Group  Not  or 









Author







 Name  ERIC Whitehead (transmitted by agent of provider Nicole Qiu)

 

 Address  [address] [place] [address]









Care Team Providers







 Name  Role  Phone

 

 Keisha Frost MD - Internal Medicine  Care Team Information   [phone]
[phone]









Problems







 Active Problems  Provider  Date

 

 Closed fracture of distal end of radius  Eron Cardenas MD  Onset: 07/10/2019







Social History







 Type  Date  Description  Comments

 

 Birth Sex    Unknown  

 

 ETOH Use    Never used alcohol  

 

 Tobacco Use  Start: Unknown  Patient has never smoked  

 

 Smoking Status  Reviewed: 19  Patient has never smoked  

 

 Exercise Type/Frequency    Exercises regularly  







Allergies, Adverse Reactions, Alerts







 Active Allergies  Reaction  Severity  Comments  Date

 

 Latex        07/10/2019

 

 Adhesive        07/10/2019







Medications







 Active Medications  SIG  Qnty  Indications  Ordering Provider  Date

 

 Acetaminophen  2 every 4 hours      Unknown  



           325mg Tablets  as needed for        



   pain        

 

 Ibuprofen  taking every 4      Unknown  



       400mg Tablets  hours        



           







Immunizations







 Description

 

 No Information Available







Vital Signs







 Date  Vital  Result  Comment

 

 2019  9:35am  Height  62 inches  5'2"









 Weight  116.00 lb  

 

 Heart Rate  95 /min  

 

 BP Systolic Sitting  92 mmHg  

 

 BP Diastolic Sitting  60 mmHg  

 

 Respiratory Rate  14 /min  

 

 Pain Level  3  

 

 BMI (Body Mass Index)  21.2 kg/m2  









 2019  9:06am  Height  62 inches  5'2"









 Weight  116.00 lb  

 

 Heart Rate  70 /min  

 

 BP Systolic  100 mmHg  

 

 BP Diastolic  62 mmHg  

 

 Respiratory Rate  14 /min  

 

 Pain Level  1  

 

 BMI (Body Mass Index)  21.2 kg/m2  







Results







 Description

 

 No Information Available







Procedures







 Date  Code  Description  Status

 

 2019  59093  Short Arm Cast Application  Completed

 

 2019  04179  Short Arm Cast Application  Completed

 

 07/10/2019  25183  Short Arm Cast Application  Completed

 

 2019  47916  Closed Treatment Distal Radial FX W/Manipulation  Completed







Medical Devices







 Description

 

 No Information Available







Encounters







 Description

 

 No Information Available







Assessments







 Date  Code  Description  Provider

 

 2019  S52.572D  Other intraarticular fracture of lower end of  ERIC Whitehead



     left radius, s  

 

 2019  S52.572D  Other intraarticular fracture of lower end of  Eron Cardenas MD



     left radius, s  

 

 2019  S52.572D  Other intraarticular fracture of lower end of  Eron Cardenas MD



     left radius, s  

 

 07/10/2019  S52.572D  Other intraarticular fracture of lower end of  Eron Cardenas MD



     left radius, subsequent encounter for closed  



     fracture with routine healing  

 

 2019  S52.502A  Unspecified fracture of the lower end of left  ERIC Schreiber



     radius, initial encounter for closed fracture  







Plan of Treatment

Future Appointment(s):2019  9:15 am - Eron Cardenas MD at [facility] - Jose Daniel Almanzar, PAS52.572D Other intraarticular 
fracture of lower end of left radius, sNew Therapy:Physical TherapyFollow up:
Follow up:   4 weeks



Functional Status







 Description

 

 No Information Available







Mental Status







 Description

 

 No Information Available







Referrals







 Description

 

 No Information Available

## 2020-02-26 ENCOUNTER — HOSPITAL ENCOUNTER (EMERGENCY)
Dept: HOSPITAL 25 - ED | Age: 85
Discharge: HOME | End: 2020-02-26
Payer: MEDICARE

## 2020-02-26 VITALS — SYSTOLIC BLOOD PRESSURE: 167 MMHG | DIASTOLIC BLOOD PRESSURE: 67 MMHG

## 2020-02-26 DIAGNOSIS — Z88.5: ICD-10-CM

## 2020-02-26 DIAGNOSIS — S01.01XA: Primary | ICD-10-CM

## 2020-02-26 DIAGNOSIS — Z91.040: ICD-10-CM

## 2020-02-26 DIAGNOSIS — Z88.8: ICD-10-CM

## 2020-02-26 DIAGNOSIS — Z88.0: ICD-10-CM
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
## 2020-02-26 DIAGNOSIS — F03.90: ICD-10-CM

## 2020-02-26 DIAGNOSIS — M50.31: ICD-10-CM
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
## 2020-02-26 DIAGNOSIS — Z79.82: ICD-10-CM

## 2020-02-26 PROCEDURE — 99284 EMERGENCY DEPT VISIT MOD MDM: CPT

## 2020-02-26 PROCEDURE — 72125 CT NECK SPINE W/O DYE: CPT

## 2020-02-26 PROCEDURE — 12002 RPR S/N/AX/GEN/TRNK2.6-7.5CM: CPT

## 2020-02-26 PROCEDURE — 70450 CT HEAD/BRAIN W/O DYE: CPT

## 2020-02-26 NOTE — ED
Head Injury





- HPI Summary


HPI Summary: 


Patient is an 88-year-old female who presents emergency department for scalp 

Laceration after mechanical fall.  Patient resides at nursing home.  Patient 

states she got up this morning to the bathroom when she lost her balance and 

fell striking her head on the sink.  She denies loss of consciousness.  Is not 

anticoagulated.  Denies chest pain, shortness of breath, lightheadedness or 

dizziness.  Symptoms are moderate in severity.  No current modifying factors.  

No other injuries sustained.








- History Of Current Complaint


Stated Complaint: HEAD WOUND FROM FALL PER EMS


Time Seen by Provider: 02/26/20 09:18


Hx Obtained From: Patient





- Allergies/Home Medications


Allergies/Adverse Reactions: 


 Allergies











Allergy/AdvReac Type Severity Reaction Status Date / Time


 


cyclobenzaprine Allergy  See Comment Verified 09/06/19 01:05


 


fluticasone Allergy  See Comment Verified 09/06/19 01:05





[From Flovent Diskus]     


 


latex Allergy  See Comment Verified 09/06/19 01:05


 


melatonin Allergy  See Comment Verified 09/06/19 01:05


 


Penicillins Allergy  See Comment Verified 09/06/19 01:05


 


trazodone Allergy  See Comment Verified 09/06/19 01:05











Home Medications: 


 Home Medications





Acetaminophen 650 mg PO Q4HR PRN 02/26/20 [History Confirmed 02/26/20]


Aspirin EC TAB* [Ecotrin EC Low Dose 81 MG*] 81 mg PO DAILY 02/26/20 [History 

Confirmed 02/26/20]


Cholecalciferol CAP/TAB(NF) [Vitamin D3 CAP/TAB (NF)] 1,000 unit PO DAILY 02/26/ 20 [History Confirmed 02/26/20]


Cyanocobalamin TAB* [Vitamin B12 TAB*] 1,000 mcg PO DAILY 02/26/20 [History 

Confirmed 02/26/20]


Hydrocortisone 1% CREAM(NF) [Hytone Cream 1%*] 1 applic TOPICAL BID PRN 02/26/ 20 [History Confirmed 02/26/20]











PMH/Surg Hx/FS Hx/Imm Hx


Previously Healthy: Yes


Endocrine/Hematology History: 


   Denies: Hx Diabetes


Cardiovascular History: 


   Denies: Hx Hypertension, Hx Pacemaker/ICD


Respiratory History: Reports: Other Respiratory Problems/Disorders


 History: 


   Denies: Hx Renal Disease


Sensory History: Reports: Hx Contacts or Glasses - no glasses with pt


   Denies: Hx Hearing Aid


Opthamlomology History: Reports: Hx Contacts or Glasses - no glasses with pt


Neurological History: Reports: Hx Dementia


Psychiatric History: 


   Denies: Hx Panic Disorder





- Surgical History


Surgery Procedure, Year, and Place: DEVIATED SEPTUM.  TONSILS





- Family History


Known Family History: Positive: Non-Contributory





- Social History


Occupation: Retired


Lives: At The Nursing Home


Alcohol Use: None


Hx Substance Use: No


Substance Use Type: Reports: None


Smoking Status (MU): Never Smoked Tobacco





Review of Systems


Eyes: Negative


ENT: Negative


Cardiovascular: Negative


Negative: Chest Pain


Respiratory: Negative


Negative: Shortness Of Breath


Gastrointestinal: Negative


Musculoskeletal: Negative


Positive: Other - scalp laceration


Positive: Headache.  Negative: Weakness, Paresthesia, Numbness, Syncope


All Other Systems Reviewed And Are Negative: Yes





Physical Exam


Triage Information Reviewed: Yes


Vital Signs Reviewed: Yes


Appearance: Positive: Well-Appearing - patient sitting in bed in no acute 

distress.  Awake and alert and answers questions appropriately.  Slightly 

confused at baseline.  Blood noted to right scalp.


Skin: Positive: Warm, Dry


Head/Face: Positive: Other - A 4 cm laceration noted to the posterior scalp 

with mild active bleeding.


Neck: Positive: Supple


Respiratory/Lung Sounds: Positive: Clear to Auscultation, Breath Sounds Present


Cardiovascular: Positive: Normal, RRR


Neurological: Positive: Normal, Alert, Oriented to Person Place, Time, CN 

Intact II-III


Psychiatric: Positive: Affect/Mood Appropriate





Procedures





- Sedation


Patient Received Moderate/Deep Sedation with Procedure: No





- Laceration/Wound Repair


  ** 1


Location: head


Description: Linear


Anesthesia: Local, 1.0%, Lido


Length, Depth and Shape: 4 cm


Irrigated w/ Saline (ccs): 250


Laceration/Wound Explored: clean


Closure: Staples #__ - 5


Layer Closure?: No


Sterile Dressing Applied?: No





Head Injury Course/Dx


Course Of Treatment: Patient with head injury and scalp laceration after 

mechanical fall.  Wound was repaired as noted above. Brain and neck CT negative 

for acute findings per radiology.  Patient discharged back to nursing home.  

Staple removal in 5-7 days.  Tylenol for pain as directed.  We'll return if 

symptoms change or worsen.





- Diagnoses


Differential Diagnosis/HQI/PQRI: Cervical Sprain, Hematoma, Intracranial Bleed, 

Laceration


Provider Diagnoses: 


 Fall, Scalp laceration








Discharge ED





- Sign-Out/Discharge


Documenting (check all that apply): Patient Departure





- Discharge Plan


Condition: Improved


Disposition: HOME


Patient Education Materials:  Head Injury (ED), Staple Care (ED)


Referrals: 


Keisha Frost MD [Primary Care Provider] - 


Additional Instructions: 


Staple removal in 5-7 days


Keep wound clean and dry


Tylenol for pain as directed


Return to ER if symptoms change or worsen





- Billing Disposition and Condition


Condition: IMPROVED


Disposition: Home